# Patient Record
Sex: FEMALE | Race: WHITE
[De-identification: names, ages, dates, MRNs, and addresses within clinical notes are randomized per-mention and may not be internally consistent; named-entity substitution may affect disease eponyms.]

---

## 2017-07-06 ENCOUNTER — HOSPITAL ENCOUNTER (OUTPATIENT)
Dept: HOSPITAL 53 - M RAD | Age: 32
End: 2017-07-06
Attending: ADVANCED PRACTICE MIDWIFE
Payer: COMMERCIAL

## 2017-07-06 DIAGNOSIS — Z34.82: Primary | ICD-10-CM

## 2017-07-06 DIAGNOSIS — Z3A.18: ICD-10-CM

## 2017-07-06 NOTE — REP
Clinical:  Anatomical evaluation.

 

Comparison: None

 

Findings:

Examination demonstrates a single live intrauterine pregnancy in variable

presentation.  Fetal motion is identified by technologist.  Placenta is noted

posteriorly and grade zero without evidence for placenta previa or abruption.

Amniotic fluid volume is normal.  Cervix measures 3.7 cm in length and appears

closed.  No evidence for nuchal cord.

 

Gestational age by LMP 18 weeks 5 days with MERON 12/02/2017 .

Gestational age by current measurements 18 weeks 2 days with MERON 12/05/2017 .

 

FHR equals 133 beats per minute.

BPD  4.0 cm     18 weeks 0 days

HC  14.9 cm     18 weeks 0 days

AC  13.1 cm     18 weeks 4 days

FL  2.7 cm      18 weeks 1 day

HL  2.7 cm      18 weeks 3 days

HC/AC ratio  1.14

 

Estimated fetal weight 237 grams ( 35th percentile).

 

Anatomical assessment demonstrates normal structures including cranium, choroid

plexus, cavum, cerebellum/posterior fossa, facial features, lungs, four-chamber

heart/ventricular outflow tracts, diaphragm, stomach, cord insertion/three-vessel

cord, kidneys/bladder, and extremities. Incomplete evaluation of the spine due to

positioning.

 

Impression:

Single live intrauterine pregnancy in variable presentation.  Appropriate

interval growth is appreciated.  With the exception of this spine, the anatomical

assessment is complete and normal.

 

 

Signed by

Frank Wagoner MD 07/06/2017 01:23 P

## 2017-07-19 ENCOUNTER — HOSPITAL ENCOUNTER (OUTPATIENT)
Dept: HOSPITAL 53 - M RAD | Age: 32
End: 2017-07-19
Attending: ADVANCED PRACTICE MIDWIFE
Payer: COMMERCIAL

## 2017-07-19 DIAGNOSIS — Z34.82: Primary | ICD-10-CM

## 2017-07-19 DIAGNOSIS — Z3A.20: ICD-10-CM

## 2017-07-19 NOTE — REP
FOLLOW-UP OB ULTRASOUND:  07/19/2017:

 

Comparison:  Anatomy screening exam 07/06/2017.

 

Clinical history:  Anatomy screen incomplete on exam 2 weeks ago for fetal spine

due to position.

 

Findings: Study again shows a single intrauterine gestation in vertex position.

A posterior grade 1 placenta without previa or abruption.  Cervix is 4.8 cm long

and closed.  Amniotic fluid volume is visually normal. Based on initial

ultrasound she is 20 weeks 1 day with EDC 12/05/2017.

 

Fetal biometry:

 

BPD 4.6 cm 20 weeks

 

HC 17.5 cm 20 weeks

 

AC 14.9 cm 20 weeks 1 day

 

FL 3.2 cm 20 weeks 1 day

 

HL 3 cm 20 weeks.

 

This gives average ultrasound age of 20 weeks, EDC 12/06/2017.  This is normal

interval growth.  Estimated fetal weight 334 grams or 11 ounces at 31st

percentile for due date based on LMP (20 weeks 4 days, 12/02/2017).  This is

normal interval growth.  Fetal heart activity noted at 140 and regular.  Cranial

vault, lateral ventricles, choroid plexus, thalami, cavum septum pellucidum,

cerebellum, cisterna magna were unremarkable.

 

Nose and lips view is normal but the profile view is not well seen. It was

visualized well on a previous study. Fetal lungs, four chamber heart view,

ventricular outflow tracts, fetal diaphragm, left sided stomach, three vessel

cord with cord insertion, kidneys and bladder, upper and lower extremities and

the entire fetal spine and sagittal and transverse planes is visible and

unremarkable.

 

Impression:

 

1.  Single intrauterine gestation in vertex position with closed 4.8 cm long

cervix, visually normal amniotic fluid volume and a posterior grade 1 placenta

without previa or abruption.

 

2. Anatomy screen should now be considered complete, the previously obscured

spine is now fully evaluated and grossly unremarkable.

 

3.  Normal interval growth.  See details above.

 

 

 

 

Signed by

Bernardo Shafer MD 08/30/2017 04:59 P

## 2017-09-13 ENCOUNTER — HOSPITAL ENCOUNTER (OUTPATIENT)
Dept: HOSPITAL 53 - M LAB | Age: 32
End: 2017-09-13
Attending: ADVANCED PRACTICE MIDWIFE
Payer: COMMERCIAL

## 2017-09-13 DIAGNOSIS — Z34.82: Primary | ICD-10-CM

## 2017-09-13 LAB
ERYTHROCYTE [DISTWIDTH] IN BLOOD BY AUTOMATED COUNT: 12.1 % (ref 11.5–14.5)
MCH RBC QN AUTO: 31.2 PG (ref 27–33)
MCHC RBC AUTO-ENTMCNC: 35 G/DL (ref 32–36.5)
MCV RBC AUTO: 89 FL (ref 80–96)
PLATELET # BLD AUTO: 289 K/MM3 (ref 150–450)
WBC # BLD AUTO: 12 K/MM3 (ref 4–10)

## 2017-11-14 ENCOUNTER — HOSPITAL ENCOUNTER (OUTPATIENT)
Dept: HOSPITAL 53 - M RAD | Age: 32
End: 2017-11-14
Attending: ADVANCED PRACTICE MIDWIFE
Payer: COMMERCIAL

## 2017-11-14 DIAGNOSIS — Z34.80: Primary | ICD-10-CM

## 2017-11-14 NOTE — REP
OB ULTRASOUND:

 

Real-time sonographic evaluation of the gravid uterus is performed.  There is a

single living intrauterine gestation.  Estimated gestational age is 37 weeks 3

days, EDC 12/02/2017.  Today's measurements indicate suboptimal growth.

 

BPD                      83 mm = 33 weeks 2 days, less than 5th percentile

 

HC                     291 mm = 32 weeks 1 day, less than 5th percentile

 

AC                     299 mm = 33 weeks 6 days, less than 5th percentile

 

Femur length             64 mm = 33 weeks 1 day, less than 5th percentile

 

HC/AC ratio 0.97 within normal range.  Estimated fetal weight 2190 grams, less

than 3rd percentile.  Cervix is closed and measures 3.3 cm in length.  Fetal

heart rate 139 beats per minute.  Amniotic fluid appears low.  TELLO is 5.9 below

normal range of 7.4 to 24.2.  Biophysical profile score 8 out of 8.  S/D ratio

2.46 is within normal range of 1.6-2.6.  RI 0.59 is within normal range of

0.59-0.75.  S/D ratio of middle cerebral artery 4.83 and resistive index 0.79.

The four chamber heart, stomach, kidneys, bladder, and spine are visualized and

are grossly unremarkable.  Fetal position vertex.  Placenta posterior and grade 0

with no previa or abruption.

 

 

Signed by

Daquan Almendarez MD 11/14/2017 01:30 P

## 2017-11-15 ENCOUNTER — HOSPITAL ENCOUNTER (INPATIENT)
Dept: HOSPITAL 53 - M LDI | Age: 32
LOS: 3 days | Discharge: HOME | End: 2017-11-18
Attending: OBSTETRICS & GYNECOLOGY | Admitting: OBSTETRICS & GYNECOLOGY
Payer: COMMERCIAL

## 2017-11-15 VITALS — SYSTOLIC BLOOD PRESSURE: 121 MMHG | DIASTOLIC BLOOD PRESSURE: 76 MMHG

## 2017-11-15 VITALS — SYSTOLIC BLOOD PRESSURE: 115 MMHG | DIASTOLIC BLOOD PRESSURE: 61 MMHG

## 2017-11-15 VITALS — SYSTOLIC BLOOD PRESSURE: 116 MMHG | DIASTOLIC BLOOD PRESSURE: 59 MMHG

## 2017-11-15 VITALS — BODY MASS INDEX: 24.44 KG/M2 | WEIGHT: 121.25 LBS | HEIGHT: 59 IN

## 2017-11-15 VITALS — DIASTOLIC BLOOD PRESSURE: 76 MMHG | SYSTOLIC BLOOD PRESSURE: 121 MMHG

## 2017-11-15 VITALS — SYSTOLIC BLOOD PRESSURE: 118 MMHG | DIASTOLIC BLOOD PRESSURE: 78 MMHG

## 2017-11-15 VITALS — SYSTOLIC BLOOD PRESSURE: 130 MMHG | DIASTOLIC BLOOD PRESSURE: 78 MMHG

## 2017-11-15 VITALS — SYSTOLIC BLOOD PRESSURE: 116 MMHG | DIASTOLIC BLOOD PRESSURE: 74 MMHG

## 2017-11-15 VITALS — DIASTOLIC BLOOD PRESSURE: 73 MMHG | SYSTOLIC BLOOD PRESSURE: 139 MMHG

## 2017-11-15 DIAGNOSIS — Z3A.37: ICD-10-CM

## 2017-11-15 DIAGNOSIS — O41.03X0: ICD-10-CM

## 2017-11-15 DIAGNOSIS — O36.5930: Primary | ICD-10-CM

## 2017-11-15 LAB
ERYTHROCYTE [DISTWIDTH] IN BLOOD BY AUTOMATED COUNT: 12.4 % (ref 11.5–14.5)
MCH RBC QN AUTO: 30.5 PG (ref 27–33)
MCHC RBC AUTO-ENTMCNC: 35.4 G/DL (ref 32–36.5)
MCV RBC AUTO: 86.1 FL (ref 80–96)
NRBC BLD AUTO-RTO: 0 % (ref 0–0)
PLATELET # BLD AUTO: 305 10^3/UL (ref 150–450)
WBC # BLD AUTO: 13.7 10^3/UL (ref 4–10)

## 2017-11-15 PROCEDURE — 3E0DXGC INTRODUCTION OF OTHER THERAPEUTIC SUBSTANCE INTO MOUTH AND PHARYNX, EXTERNAL APPROACH: ICD-10-PCS | Performed by: OBSTETRICS & GYNECOLOGY

## 2017-11-15 RX ADMIN — SODIUM CHLORIDE, POTASSIUM CHLORIDE, SODIUM LACTATE AND CALCIUM CHLORIDE SCH MLS/HR: 600; 310; 30; 20 INJECTION, SOLUTION INTRAVENOUS at 21:54

## 2017-11-15 RX ADMIN — SODIUM CHLORIDE, POTASSIUM CHLORIDE, SODIUM LACTATE AND CALCIUM CHLORIDE SCH MLS/HR: 600; 310; 30; 20 INJECTION, SOLUTION INTRAVENOUS at 13:52

## 2017-11-16 VITALS — DIASTOLIC BLOOD PRESSURE: 56 MMHG | SYSTOLIC BLOOD PRESSURE: 98 MMHG

## 2017-11-16 VITALS — SYSTOLIC BLOOD PRESSURE: 108 MMHG | DIASTOLIC BLOOD PRESSURE: 57 MMHG

## 2017-11-16 VITALS — DIASTOLIC BLOOD PRESSURE: 76 MMHG | SYSTOLIC BLOOD PRESSURE: 124 MMHG

## 2017-11-16 VITALS — DIASTOLIC BLOOD PRESSURE: 81 MMHG | SYSTOLIC BLOOD PRESSURE: 116 MMHG

## 2017-11-16 VITALS — SYSTOLIC BLOOD PRESSURE: 122 MMHG | DIASTOLIC BLOOD PRESSURE: 80 MMHG

## 2017-11-16 VITALS — DIASTOLIC BLOOD PRESSURE: 54 MMHG | SYSTOLIC BLOOD PRESSURE: 99 MMHG

## 2017-11-16 VITALS — DIASTOLIC BLOOD PRESSURE: 63 MMHG | SYSTOLIC BLOOD PRESSURE: 101 MMHG

## 2017-11-16 VITALS — SYSTOLIC BLOOD PRESSURE: 101 MMHG | DIASTOLIC BLOOD PRESSURE: 60 MMHG

## 2017-11-16 VITALS — DIASTOLIC BLOOD PRESSURE: 58 MMHG | SYSTOLIC BLOOD PRESSURE: 98 MMHG

## 2017-11-16 VITALS — SYSTOLIC BLOOD PRESSURE: 121 MMHG | DIASTOLIC BLOOD PRESSURE: 74 MMHG

## 2017-11-16 VITALS — DIASTOLIC BLOOD PRESSURE: 63 MMHG | SYSTOLIC BLOOD PRESSURE: 98 MMHG

## 2017-11-16 VITALS — DIASTOLIC BLOOD PRESSURE: 88 MMHG | SYSTOLIC BLOOD PRESSURE: 132 MMHG

## 2017-11-16 VITALS — SYSTOLIC BLOOD PRESSURE: 108 MMHG | DIASTOLIC BLOOD PRESSURE: 68 MMHG

## 2017-11-16 VITALS — DIASTOLIC BLOOD PRESSURE: 65 MMHG | SYSTOLIC BLOOD PRESSURE: 108 MMHG

## 2017-11-16 VITALS — SYSTOLIC BLOOD PRESSURE: 138 MMHG | DIASTOLIC BLOOD PRESSURE: 82 MMHG

## 2017-11-16 VITALS — DIASTOLIC BLOOD PRESSURE: 66 MMHG | SYSTOLIC BLOOD PRESSURE: 104 MMHG

## 2017-11-16 VITALS — DIASTOLIC BLOOD PRESSURE: 62 MMHG | SYSTOLIC BLOOD PRESSURE: 101 MMHG

## 2017-11-16 VITALS — DIASTOLIC BLOOD PRESSURE: 55 MMHG | SYSTOLIC BLOOD PRESSURE: 102 MMHG

## 2017-11-16 VITALS — SYSTOLIC BLOOD PRESSURE: 112 MMHG | DIASTOLIC BLOOD PRESSURE: 71 MMHG

## 2017-11-16 VITALS — SYSTOLIC BLOOD PRESSURE: 120 MMHG | DIASTOLIC BLOOD PRESSURE: 78 MMHG

## 2017-11-16 VITALS — SYSTOLIC BLOOD PRESSURE: 113 MMHG | DIASTOLIC BLOOD PRESSURE: 71 MMHG

## 2017-11-16 VITALS — SYSTOLIC BLOOD PRESSURE: 117 MMHG | DIASTOLIC BLOOD PRESSURE: 72 MMHG

## 2017-11-16 VITALS — SYSTOLIC BLOOD PRESSURE: 105 MMHG | DIASTOLIC BLOOD PRESSURE: 67 MMHG

## 2017-11-16 VITALS — SYSTOLIC BLOOD PRESSURE: 124 MMHG | DIASTOLIC BLOOD PRESSURE: 66 MMHG

## 2017-11-16 VITALS — DIASTOLIC BLOOD PRESSURE: 86 MMHG | SYSTOLIC BLOOD PRESSURE: 114 MMHG

## 2017-11-16 VITALS — SYSTOLIC BLOOD PRESSURE: 99 MMHG | DIASTOLIC BLOOD PRESSURE: 57 MMHG

## 2017-11-16 VITALS — DIASTOLIC BLOOD PRESSURE: 56 MMHG | SYSTOLIC BLOOD PRESSURE: 99 MMHG

## 2017-11-16 VITALS — DIASTOLIC BLOOD PRESSURE: 66 MMHG | SYSTOLIC BLOOD PRESSURE: 106 MMHG

## 2017-11-16 VITALS — SYSTOLIC BLOOD PRESSURE: 97 MMHG | DIASTOLIC BLOOD PRESSURE: 53 MMHG

## 2017-11-16 VITALS — DIASTOLIC BLOOD PRESSURE: 62 MMHG | SYSTOLIC BLOOD PRESSURE: 100 MMHG

## 2017-11-16 VITALS — SYSTOLIC BLOOD PRESSURE: 112 MMHG | DIASTOLIC BLOOD PRESSURE: 68 MMHG

## 2017-11-16 VITALS — DIASTOLIC BLOOD PRESSURE: 74 MMHG | SYSTOLIC BLOOD PRESSURE: 111 MMHG

## 2017-11-16 VITALS — SYSTOLIC BLOOD PRESSURE: 137 MMHG | DIASTOLIC BLOOD PRESSURE: 88 MMHG

## 2017-11-16 PROCEDURE — 10907ZC DRAINAGE OF AMNIOTIC FLUID, THERAPEUTIC FROM PRODUCTS OF CONCEPTION, VIA NATURAL OR ARTIFICIAL OPENING: ICD-10-PCS | Performed by: OBSTETRICS & GYNECOLOGY

## 2017-11-16 RX ADMIN — Medication SCH TAB: at 08:19

## 2017-11-17 VITALS — DIASTOLIC BLOOD PRESSURE: 77 MMHG | SYSTOLIC BLOOD PRESSURE: 130 MMHG

## 2017-11-17 VITALS — SYSTOLIC BLOOD PRESSURE: 125 MMHG | DIASTOLIC BLOOD PRESSURE: 82 MMHG

## 2017-11-17 RX ADMIN — Medication SCH TAB: at 08:08

## 2017-11-18 VITALS — DIASTOLIC BLOOD PRESSURE: 73 MMHG | SYSTOLIC BLOOD PRESSURE: 124 MMHG

## 2017-11-18 RX ADMIN — Medication SCH TAB: at 07:39

## 2018-05-23 ENCOUNTER — HOSPITAL ENCOUNTER (OUTPATIENT)
Dept: HOSPITAL 53 - M LAB | Age: 33
End: 2018-05-23
Attending: OBSTETRICS & GYNECOLOGY
Payer: COMMERCIAL

## 2018-05-23 DIAGNOSIS — Z36.89: Primary | ICD-10-CM

## 2018-05-23 DIAGNOSIS — Z3A.01: ICD-10-CM

## 2018-05-23 LAB
BASO #: 0 10^3/UL (ref 0–0.2)
BASO %: 0.4 % (ref 0–1)
CHLAMYDIA DNA AMPLIFICATION: NEGATIVE
EOS #: 0.1 10^3/UL (ref 0–0.5)
EOSINOPHIL NFR BLD AUTO: 1.2 % (ref 0–3)
GC DNA AMPLIFICATION: NEGATIVE
HBSAG PRENATAL: NEGATIVE
HCV AB SER QL: < 0 INDEX (ref ?–0.8)
HEMATOCRIT: 39.6 % (ref 36–47)
HEMOGLOBIN: 14 G/DL (ref 12–15.5)
HIV 1&2 SCREEN CENTAUR: NEGATIVE
IMMATURE GRANULOCYTE %: 0.4 % (ref 0–3)
LYMPH #: 2.1 10^3/UL (ref 1.5–4.5)
LYMPH %: 18.5 % (ref 24–44)
MEAN CORPUSCULAR HEMOGLOBIN: 30.7 PG (ref 27–33)
MEAN CORPUSCULAR HGB CONC: 35.4 G/DL (ref 32–36.5)
MEAN CORPUSCULAR VOLUME: 86.8 FL (ref 80–96)
MONO #: 0.6 10^3/UL (ref 0–0.8)
MONO %: 5.2 % (ref 0–5)
NEUTROPHILS #: 8.4 10^3/UL (ref 1.8–7.7)
NEUTROPHILS %: 74.3 % (ref 36–66)
NRBC BLD AUTO-RTO: 0 % (ref 0–0)
PLATELET COUNT, AUTOMATED: 312 10^3/UL (ref 150–450)
RED BLOOD COUNT: 4.56 10^6/UL (ref 4–5.4)
RED CELL DISTRIBUTION WIDTH: 11.9 % (ref 11.5–14.5)
RUBELLA IGG QUALITATIVE: (no result)
SYPHILIS: NONREACTIVE
WHITE BLOOD COUNT: 11.2 10^3/UL (ref 4–10)

## 2018-05-23 PROCEDURE — 86762 RUBELLA ANTIBODY: CPT
